# Patient Record
Sex: FEMALE | Race: OTHER | NOT HISPANIC OR LATINO | URBAN - METROPOLITAN AREA
[De-identification: names, ages, dates, MRNs, and addresses within clinical notes are randomized per-mention and may not be internally consistent; named-entity substitution may affect disease eponyms.]

---

## 2017-07-01 ENCOUNTER — EMERGENCY (EMERGENCY)
Facility: HOSPITAL | Age: 52
LOS: 1 days | Discharge: HOME | End: 2017-07-01
Attending: EMERGENCY MEDICINE
Payer: MEDICAID

## 2017-07-01 VITALS
RESPIRATION RATE: 18 BRPM | TEMPERATURE: 98 F | WEIGHT: 130.07 LBS | SYSTOLIC BLOOD PRESSURE: 123 MMHG | HEIGHT: 64 IN | HEART RATE: 74 BPM | OXYGEN SATURATION: 99 % | DIASTOLIC BLOOD PRESSURE: 71 MMHG

## 2017-07-01 VITALS
SYSTOLIC BLOOD PRESSURE: 118 MMHG | TEMPERATURE: 98 F | DIASTOLIC BLOOD PRESSURE: 69 MMHG | RESPIRATION RATE: 18 BRPM | OXYGEN SATURATION: 100 % | HEART RATE: 72 BPM

## 2017-07-01 LAB
ALBUMIN SERPL ELPH-MCNC: 4.9 G/DL — SIGNIFICANT CHANGE UP (ref 3.3–5.2)
ALP SERPL-CCNC: 80 U/L — SIGNIFICANT CHANGE UP (ref 40–120)
ALT FLD-CCNC: 19 U/L — SIGNIFICANT CHANGE UP
ANION GAP SERPL CALC-SCNC: 15 MMOL/L — SIGNIFICANT CHANGE UP (ref 5–17)
AST SERPL-CCNC: 25 U/L — SIGNIFICANT CHANGE UP
BASOPHILS # BLD AUTO: 0.1 K/UL — SIGNIFICANT CHANGE UP (ref 0–0.2)
BASOPHILS NFR BLD AUTO: 0.8 % — SIGNIFICANT CHANGE UP (ref 0–2)
BILIRUB SERPL-MCNC: 1.1 MG/DL — SIGNIFICANT CHANGE UP (ref 0.4–2)
BUN SERPL-MCNC: 13 MG/DL — SIGNIFICANT CHANGE UP (ref 8–20)
CALCIUM SERPL-MCNC: 9.6 MG/DL — SIGNIFICANT CHANGE UP (ref 8.6–10.2)
CHLORIDE SERPL-SCNC: 99 MMOL/L — SIGNIFICANT CHANGE UP (ref 98–107)
CO2 SERPL-SCNC: 25 MMOL/L — SIGNIFICANT CHANGE UP (ref 22–29)
CREAT SERPL-MCNC: 0.66 MG/DL — SIGNIFICANT CHANGE UP (ref 0.5–1.3)
EOSINOPHIL # BLD AUTO: 0.6 K/UL — HIGH (ref 0–0.5)
EOSINOPHIL NFR BLD AUTO: 6.4 % — HIGH (ref 0–6)
GLUCOSE SERPL-MCNC: 145 MG/DL — HIGH (ref 70–115)
HCT VFR BLD CALC: 43.2 % — SIGNIFICANT CHANGE UP (ref 37–47)
HGB BLD-MCNC: 15.1 G/DL — SIGNIFICANT CHANGE UP (ref 12–16)
LYMPHOCYTES # BLD AUTO: 2.7 K/UL — SIGNIFICANT CHANGE UP (ref 1–4.8)
LYMPHOCYTES # BLD AUTO: 29.7 % — SIGNIFICANT CHANGE UP (ref 20–55)
MCHC RBC-ENTMCNC: 31.4 PG — HIGH (ref 27–31)
MCHC RBC-ENTMCNC: 35 G/DL — SIGNIFICANT CHANGE UP (ref 32–36)
MCV RBC AUTO: 89.8 FL — SIGNIFICANT CHANGE UP (ref 81–99)
MONOCYTES # BLD AUTO: 0.8 K/UL — SIGNIFICANT CHANGE UP (ref 0–0.8)
MONOCYTES NFR BLD AUTO: 8.4 % — SIGNIFICANT CHANGE UP (ref 3–10)
NEUTROPHILS # BLD AUTO: 4.9 K/UL — SIGNIFICANT CHANGE UP (ref 1.8–8)
NEUTROPHILS NFR BLD AUTO: 54.5 % — SIGNIFICANT CHANGE UP (ref 37–73)
OB PNL STL: NEGATIVE — SIGNIFICANT CHANGE UP
PLATELET # BLD AUTO: 276 K/UL — SIGNIFICANT CHANGE UP (ref 150–400)
POTASSIUM SERPL-MCNC: 3.7 MMOL/L — SIGNIFICANT CHANGE UP (ref 3.5–5.3)
POTASSIUM SERPL-SCNC: 3.7 MMOL/L — SIGNIFICANT CHANGE UP (ref 3.5–5.3)
PROT SERPL-MCNC: 7.9 G/DL — SIGNIFICANT CHANGE UP (ref 6.6–8.7)
RBC # BLD: 4.81 M/UL — SIGNIFICANT CHANGE UP (ref 4.4–5.2)
RBC # FLD: 13.7 % — SIGNIFICANT CHANGE UP (ref 11–15.6)
SODIUM SERPL-SCNC: 139 MMOL/L — SIGNIFICANT CHANGE UP (ref 135–145)
TROPONIN T SERPL-MCNC: <0.01 NG/ML — SIGNIFICANT CHANGE UP (ref 0–0.06)
WBC # BLD: 9 K/UL — SIGNIFICANT CHANGE UP (ref 4.8–10.8)
WBC # FLD AUTO: 9 K/UL — SIGNIFICANT CHANGE UP (ref 4.8–10.8)

## 2017-07-01 PROCEDURE — 85027 COMPLETE CBC AUTOMATED: CPT

## 2017-07-01 PROCEDURE — 86618 LYME DISEASE ANTIBODY: CPT

## 2017-07-01 PROCEDURE — 80053 COMPREHEN METABOLIC PANEL: CPT

## 2017-07-01 PROCEDURE — 70450 CT HEAD/BRAIN W/O DYE: CPT | Mod: 26

## 2017-07-01 PROCEDURE — 36415 COLL VENOUS BLD VENIPUNCTURE: CPT

## 2017-07-01 PROCEDURE — 93010 ELECTROCARDIOGRAM REPORT: CPT

## 2017-07-01 PROCEDURE — 70450 CT HEAD/BRAIN W/O DYE: CPT

## 2017-07-01 PROCEDURE — 84484 ASSAY OF TROPONIN QUANT: CPT

## 2017-07-01 PROCEDURE — 82272 OCCULT BLD FECES 1-3 TESTS: CPT

## 2017-07-01 PROCEDURE — 99285 EMERGENCY DEPT VISIT HI MDM: CPT

## 2017-07-01 PROCEDURE — 99284 EMERGENCY DEPT VISIT MOD MDM: CPT | Mod: 25

## 2017-07-01 PROCEDURE — 93005 ELECTROCARDIOGRAM TRACING: CPT

## 2017-07-01 RX ORDER — SODIUM CHLORIDE 9 MG/ML
3 INJECTION INTRAMUSCULAR; INTRAVENOUS; SUBCUTANEOUS EVERY 8 HOURS
Qty: 0 | Refills: 0 | Status: DISCONTINUED | OUTPATIENT
Start: 2017-07-01 | End: 2017-07-05

## 2017-07-01 RX ORDER — SACCHAROMYCES BOULARDII 250 MG
1 POWDER IN PACKET (EA) ORAL
Qty: 20 | Refills: 0 | OUTPATIENT
Start: 2017-07-01 | End: 2017-07-11

## 2017-07-01 RX ORDER — MECLIZINE HCL 12.5 MG
1 TABLET ORAL
Qty: 15 | Refills: 0 | OUTPATIENT
Start: 2017-07-01 | End: 2017-07-06

## 2017-07-01 RX ORDER — FLUTICASONE PROPIONATE 50 MCG
1 SPRAY, SUSPENSION NASAL
Qty: 1 | Refills: 0 | OUTPATIENT
Start: 2017-07-01 | End: 2017-07-31

## 2017-07-01 RX ORDER — MECLIZINE HCL 12.5 MG
25 TABLET ORAL ONCE
Qty: 0 | Refills: 0 | Status: COMPLETED | OUTPATIENT
Start: 2017-07-01 | End: 2017-07-01

## 2017-07-01 RX ADMIN — Medication 1 TABLET(S): at 20:59

## 2017-07-01 RX ADMIN — Medication 25 MILLIGRAM(S): at 19:59

## 2017-07-01 NOTE — ED STATDOCS - ATTENDING CONTRIBUTION TO CARE
I, Juan C Hernandez, performed the initial face to face bedside interview with this patient regarding history of present illness, review of symptoms and relevant past medical, social and family history.  I completed an independent physical examination.  I was the initial provider who evaluated this patient. I have signed out the follow up of any pending tests (i.e. labs, radiological studies) to the ACP.  I have communicated the patient’s plan of care and disposition with the ACP.  The history, relevant review of systems, past medical and surgical history, medical decision making, and physical examination was documented by the scribe in my presence and I attest to the accuracy of the documentation.

## 2017-07-01 NOTE — ED ADULT NURSE NOTE - OBJECTIVE STATEMENT
patient states that she has been feeling dizzy whenever she gets up, she denies any chest pain or pain/ discomfort

## 2017-07-01 NOTE — ED STATDOCS - PROGRESS NOTE DETAILS
HPI, ROS, PE done by intake doc. Orders/Plan reviewed. Pt presents today with dizziness x several days. Pt states she is unsure if she was bite by a tick a few weeks ago. She also thinks she might have bloody stool. She admits to hx of constipation and one episode of vomiting on Thursday. Pt states she feels "out of it" and does not describe the room as spinning. No hx of vertigo. Sister states she has been blowing her nose all day. She denies fever, chills, nausea, vomiting-currently, sob, cp, hp, abd pain, weakness, confusion, headache, facial droop, photophobia, nuchal rigidity. PE- Well developed, well-nourish, resting comfortably in NAD. Nasal sounding. NO facial droop. Eyes: No nystagmus. PERRL b/l. EOM intact b/l. Ears: WNL Mouth: uvula midline. tongue midline. Sinus: tenderness to frontal and maxillary sinuses Abdomen- BS normoactive. Soft, nontender to palpation. Rectal exam: no rajiv blood. tag to outside of rectum. No masses. No tenderness. No internal hemorrhoids. Neuro: intact without focal deficits, A&Ox3, no gross sensory or motor deficits. No weakness. CN 2-12 intact. Negative rhomberg. No pronator. Finger to nose and rapid alternating motions intact. Gait intact. Heel to shin intact. Plan: Pt feeling better after meclizine. CT suggests sinusitis. Pt admits to feeling sinus pressure and always has problems with her sinuses. Will treat with Augmentin and florastor. Will give flonase and meclizine. F/u with ENT/GI/pmd/Neuro. Recommends outpt colonoscopy. EKG here WNL. Labs WNL except elevated glucose. Advised pt to have diabetes work up outpt.

## 2017-07-01 NOTE — ED STATDOCS - OBJECTIVE STATEMENT
51 y/o F presents to ED c/o dizziness x 2 weeks. Pt reports she went to the doctor in [place] and they told her to come to the ED to get blood tests. Pt states 4 days ago she woke up and the room was spinning and was imbalanced. Pt reports she is now only dizzy, and is unsure if she had bloody stool. Pt also states she was taking Ambient. Pt denies heart problems, nausea, fever, vomiting, diarrhea, SOB and CP.

## 2017-07-03 LAB
B BURGDOR C6 AB SER-ACNC: NEGATIVE — SIGNIFICANT CHANGE UP
B BURGDOR IGG+IGM SER-ACNC: 0.11 INDEX — SIGNIFICANT CHANGE UP (ref 0.01–0.89)

## 2017-10-09 NOTE — ED ADULT NURSE NOTE - INTEGUMENTARY WDL
Patient advised, appointment scheduled 1/8/18. Color consistent with ethnicity/race, warm, dry intact, resilient.

## 2021-01-18 ENCOUNTER — EMERGENCY (EMERGENCY)
Facility: HOSPITAL | Age: 56
LOS: 1 days | Discharge: HOME | End: 2021-01-18
Attending: EMERGENCY MEDICINE | Admitting: EMERGENCY MEDICINE
Payer: COMMERCIAL

## 2021-01-18 VITALS
WEIGHT: 136.03 LBS | DIASTOLIC BLOOD PRESSURE: 73 MMHG | TEMPERATURE: 99 F | OXYGEN SATURATION: 99 % | RESPIRATION RATE: 18 BRPM | HEART RATE: 84 BPM | HEIGHT: 64 IN | SYSTOLIC BLOOD PRESSURE: 145 MMHG

## 2021-01-18 DIAGNOSIS — F29 UNSPECIFIED PSYCHOSIS NOT DUE TO A SUBSTANCE OR KNOWN PHYSIOLOGICAL CONDITION: ICD-10-CM

## 2021-01-18 DIAGNOSIS — Z20.822 CONTACT WITH AND (SUSPECTED) EXPOSURE TO COVID-19: ICD-10-CM

## 2021-01-18 LAB
ALBUMIN SERPL ELPH-MCNC: 4.6 G/DL — SIGNIFICANT CHANGE UP (ref 3.3–5)
ALP SERPL-CCNC: 69 U/L — SIGNIFICANT CHANGE UP (ref 40–120)
ALT FLD-CCNC: 31 U/L — SIGNIFICANT CHANGE UP (ref 10–45)
AMPHET UR-MCNC: NEGATIVE — SIGNIFICANT CHANGE UP
ANION GAP SERPL CALC-SCNC: 13 MMOL/L — SIGNIFICANT CHANGE UP (ref 5–17)
APPEARANCE UR: CLEAR — SIGNIFICANT CHANGE UP
AST SERPL-CCNC: 43 U/L — HIGH (ref 10–40)
BARBITURATES UR SCN-MCNC: NEGATIVE — SIGNIFICANT CHANGE UP
BASOPHILS # BLD AUTO: 0.04 K/UL — SIGNIFICANT CHANGE UP (ref 0–0.2)
BASOPHILS NFR BLD AUTO: 0.5 % — SIGNIFICANT CHANGE UP (ref 0–2)
BENZODIAZ UR-MCNC: NEGATIVE — SIGNIFICANT CHANGE UP
BILIRUB SERPL-MCNC: 1.6 MG/DL — HIGH (ref 0.2–1.2)
BILIRUB UR-MCNC: NEGATIVE — SIGNIFICANT CHANGE UP
BUN SERPL-MCNC: 9 MG/DL — SIGNIFICANT CHANGE UP (ref 7–23)
CALCIUM SERPL-MCNC: 9.3 MG/DL — SIGNIFICANT CHANGE UP (ref 8.4–10.5)
CHLORIDE SERPL-SCNC: 102 MMOL/L — SIGNIFICANT CHANGE UP (ref 96–108)
CO2 SERPL-SCNC: 24 MMOL/L — SIGNIFICANT CHANGE UP (ref 22–31)
COCAINE METAB.OTHER UR-MCNC: POSITIVE
COLOR SPEC: YELLOW — SIGNIFICANT CHANGE UP
CREAT SERPL-MCNC: 0.73 MG/DL — SIGNIFICANT CHANGE UP (ref 0.5–1.3)
DIFF PNL FLD: NEGATIVE — SIGNIFICANT CHANGE UP
EOSINOPHIL # BLD AUTO: 0.2 K/UL — SIGNIFICANT CHANGE UP (ref 0–0.5)
EOSINOPHIL NFR BLD AUTO: 2.7 % — SIGNIFICANT CHANGE UP (ref 0–6)
ETHANOL SERPL-MCNC: <10 MG/DL — SIGNIFICANT CHANGE UP (ref 0–10)
GLUCOSE SERPL-MCNC: 104 MG/DL — HIGH (ref 70–99)
GLUCOSE UR QL: NEGATIVE — SIGNIFICANT CHANGE UP
HCT VFR BLD CALC: 42.5 % — SIGNIFICANT CHANGE UP (ref 34.5–45)
HGB BLD-MCNC: 14 G/DL — SIGNIFICANT CHANGE UP (ref 11.5–15.5)
IMM GRANULOCYTES NFR BLD AUTO: 0.1 % — SIGNIFICANT CHANGE UP (ref 0–1.5)
KETONES UR-MCNC: 15 MG/DL
LEUKOCYTE ESTERASE UR-ACNC: NEGATIVE — SIGNIFICANT CHANGE UP
LYMPHOCYTES # BLD AUTO: 1.89 K/UL — SIGNIFICANT CHANGE UP (ref 1–3.3)
LYMPHOCYTES # BLD AUTO: 25.6 % — SIGNIFICANT CHANGE UP (ref 13–44)
MCHC RBC-ENTMCNC: 29.1 PG — SIGNIFICANT CHANGE UP (ref 27–34)
MCHC RBC-ENTMCNC: 32.9 GM/DL — SIGNIFICANT CHANGE UP (ref 32–36)
MCV RBC AUTO: 88.4 FL — SIGNIFICANT CHANGE UP (ref 80–100)
METHADONE UR-MCNC: NEGATIVE — SIGNIFICANT CHANGE UP
MONOCYTES # BLD AUTO: 0.52 K/UL — SIGNIFICANT CHANGE UP (ref 0–0.9)
MONOCYTES NFR BLD AUTO: 7.1 % — SIGNIFICANT CHANGE UP (ref 2–14)
NEUTROPHILS # BLD AUTO: 4.71 K/UL — SIGNIFICANT CHANGE UP (ref 1.8–7.4)
NEUTROPHILS NFR BLD AUTO: 64 % — SIGNIFICANT CHANGE UP (ref 43–77)
NITRITE UR-MCNC: NEGATIVE — SIGNIFICANT CHANGE UP
NRBC # BLD: 0 /100 WBCS — SIGNIFICANT CHANGE UP (ref 0–0)
OPIATES UR-MCNC: NEGATIVE — SIGNIFICANT CHANGE UP
PCP SPEC-MCNC: SIGNIFICANT CHANGE UP
PCP UR-MCNC: NEGATIVE — SIGNIFICANT CHANGE UP
PH UR: 6 — SIGNIFICANT CHANGE UP (ref 5–8)
PLATELET # BLD AUTO: 254 K/UL — SIGNIFICANT CHANGE UP (ref 150–400)
POTASSIUM SERPL-MCNC: 4.3 MMOL/L — SIGNIFICANT CHANGE UP (ref 3.5–5.3)
POTASSIUM SERPL-SCNC: 4.3 MMOL/L — SIGNIFICANT CHANGE UP (ref 3.5–5.3)
PROT SERPL-MCNC: 7 G/DL — SIGNIFICANT CHANGE UP (ref 6–8.3)
PROT UR-MCNC: NEGATIVE MG/DL — SIGNIFICANT CHANGE UP
RBC # BLD: 4.81 M/UL — SIGNIFICANT CHANGE UP (ref 3.8–5.2)
RBC # FLD: 13.2 % — SIGNIFICANT CHANGE UP (ref 10.3–14.5)
SARS-COV-2 RNA SPEC QL NAA+PROBE: SIGNIFICANT CHANGE UP
SODIUM SERPL-SCNC: 139 MMOL/L — SIGNIFICANT CHANGE UP (ref 135–145)
SP GR SPEC: 1.02 — SIGNIFICANT CHANGE UP (ref 1–1.03)
THC UR QL: NEGATIVE — SIGNIFICANT CHANGE UP
TSH SERPL-MCNC: 1.21 UIU/ML — SIGNIFICANT CHANGE UP (ref 0.35–4.94)
UROBILINOGEN FLD QL: 0.2 E.U./DL — SIGNIFICANT CHANGE UP
WBC # BLD: 7.37 K/UL — SIGNIFICANT CHANGE UP (ref 3.8–10.5)
WBC # FLD AUTO: 7.37 K/UL — SIGNIFICANT CHANGE UP (ref 3.8–10.5)

## 2021-01-18 PROCEDURE — 83735 ASSAY OF MAGNESIUM: CPT

## 2021-01-18 PROCEDURE — 99285 EMERGENCY DEPT VISIT HI MDM: CPT

## 2021-01-18 PROCEDURE — 70450 CT HEAD/BRAIN W/O DYE: CPT | Mod: 26,MA

## 2021-01-18 PROCEDURE — 93005 ELECTROCARDIOGRAM TRACING: CPT

## 2021-01-18 PROCEDURE — 93010 ELECTROCARDIOGRAM REPORT: CPT

## 2021-01-18 PROCEDURE — 90792 PSYCH DIAG EVAL W/MED SRVCS: CPT

## 2021-01-18 PROCEDURE — 99285 EMERGENCY DEPT VISIT HI MDM: CPT | Mod: 25

## 2021-01-18 PROCEDURE — U0003: CPT

## 2021-01-18 PROCEDURE — 85025 COMPLETE CBC W/AUTO DIFF WBC: CPT

## 2021-01-18 PROCEDURE — 36415 COLL VENOUS BLD VENIPUNCTURE: CPT

## 2021-01-18 PROCEDURE — U0005: CPT

## 2021-01-18 PROCEDURE — 80053 COMPREHEN METABOLIC PANEL: CPT

## 2021-01-18 PROCEDURE — 80307 DRUG TEST PRSMV CHEM ANLYZR: CPT

## 2021-01-18 PROCEDURE — 84443 ASSAY THYROID STIM HORMONE: CPT

## 2021-01-18 PROCEDURE — 70450 CT HEAD/BRAIN W/O DYE: CPT

## 2021-01-18 PROCEDURE — 81003 URINALYSIS AUTO W/O SCOPE: CPT

## 2021-01-18 NOTE — ED PROVIDER NOTE - NSRISKOFTRANSFER_ED_A_ED
Deterioration of Condition En Route/Death or Disability/Increased Pain/Transportation Risk (There is always a risk of traffic delays resulting in deterioration of condition.)/Other:

## 2021-01-18 NOTE — ED PROVIDER NOTE - PATIENT PORTAL LINK FT
You can access the FollowMyHealth Patient Portal offered by [facility] by registering at the following website: [email or web address]. By joining "[employer]’s FollowMyHealth portal, you will also be able to view your health information using other applications (apps) compatible with our system.

## 2021-01-18 NOTE — ED BEHAVIORAL HEALTH ASSESSMENT NOTE - DESCRIPTION
denies patient is presenting with paranoia and VH. She does not wish to stay in the hospital. For this reason a 9.27 was completed for a psych admission.     Extensive psychoeducation was provided to father about possible differentials for patient's presentation. Differentials included delirium from medical cause vs acute psychotic episode vs first onset manic episode (brother is bipolar, mother is schizophrenic). After psychoeducation was provided to father, utox of patient came back positive for cocaine, which is another, very likely differential. Patient does not allow me to disclose to father drug use.    Writer spoke with ED physician about whether it makes sense to monitor patient overnight in ED, given the positive cocaine utox and see if her paranoia resolves overnight. However, patient is acutely paranoid, a flight risk, and staying in ED overnight is not a safe option given their limitation for supervision. 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

## 2021-01-18 NOTE — ED ADULT NURSE NOTE - NS ED NURSE DISCH DISPOSITION
Received a new referral from Any Hayden NP, for ARCINIEGA (dyspnea on exertion)   Transferred Discharged

## 2021-01-18 NOTE — ED BEHAVIORAL HEALTH ASSESSMENT NOTE - DETAILS
n/a brother is bipolar, mother schizophrenic to telepsych team which will handle placement denies attending

## 2021-01-18 NOTE — ED PROVIDER NOTE - CLINICAL SUMMARY MEDICAL DECISION MAKING FREE TEXT BOX
54 yo F, post-menopausal X 3-4 years, takes Valtrex prn cold sores and Ambien as needed for insomnia (and has not taken this in months), otherwise healthy and denies any other medical or psychiatric history presents from her PCP's office (Dr. Hewitt) as she was told that she needed to come in for a psychiatric evaluation. Pt is here with her father and states "I have a lot of family drama going on now and we came home and everything was different". Pt states that she feels like people have moved things around the house without her knowledge. Also states that her gloves are not fitting correctly and she knows that "something is wrong". States her family member may have used her gloves without her knowledge. Denies S/HI/ hallucinations. Pt denies any history of chronic psychiatric illness or admissions for psychiatric illness in the past. States that 3 years ago she was prescribed an anti-depressant (name unknown), but only took 3 pills. Denies illicit drug use/ tobacco use. Drinks alcohol 2-3 times per week. No CP, SOB, HA, fevers, chills, focal neuro deficits, cough, abd pain, neck pain, urinary sxs. No other complaints.  ED course: Pt afebrile and HD stable. Labs noted and with no acute findings. Alcohol negative. UA negative for infection. Psych consulted and pt needs to be admitted for psychoses. 1:1 observation placed. Pt initially refusing to undress and be admitted but was convinced to stay. Involuntary paperwork signed by psychiatrist and myself/ RN and bed to be arranged by SW/ and telepsych. Drug screen positive for cocaine and pt admits to using cocaine yesterday, after previously denying any illicit drugs. Pt medically cleared and stable in ED. Plan discussed with pt's father. Pt stable in ED. 56 yo F, post-menopausal X 3-4 years, takes Valtrex prn cold sores and Ambien as needed for insomnia (and has not taken this in months), otherwise healthy and denies any other medical or psychiatric history presents from her PCP's office (Dr. Hewitt) as she was told that she needed to come in for a psychiatric evaluation. Pt is here with her father and states "I have a lot of family drama going on now and we came home and everything was different". Pt states that she feels like people have moved things around the house without her knowledge. Also states that her gloves are not fitting correctly and she knows that "something is wrong". States her family member may have used her gloves without her knowledge. Denies S/HI/ hallucinations. Pt denies any history of chronic psychiatric illness or admissions for psychiatric illness in the past. States that 3 years ago she was prescribed an anti-depressant (name unknown), but only took 3 pills. Denies illicit drug use/ tobacco use. Drinks alcohol 2-3 times per week. No CP, SOB, HA, fevers, chills, focal neuro deficits, cough, abd pain, neck pain, urinary sxs. No other complaints.  ED course: Pt afebrile and HD stable. Labs noted and with no acute findings. Alcohol <10. UA negative for infection. CT head with NAD. Covid 19 pending. Psych consulted and pt needs to be admitted for psychoses. 1:1 observation placed. Pt initially refusing to undress and be admitted but was convinced to stay. Involuntary paperwork signed by psychiatrist and myself/ RN and bed to be arranged by SW/ and telepsych. Drug screen positive for cocaine and pt admits to using cocaine yesterday, after previously denying any illicit drugs. Pt medically cleared and stable in ED. Plan discussed with pt's father. Pt stable in ED. Case endorsed to night team pending psych placement.

## 2021-01-18 NOTE — ED PROVIDER NOTE - PROGRESS NOTE DETAILS
Psych consulted and pt needs to be admitted for psychoses. 1:1 observation placed. Pt initially refusing to undress and be admitted but was convinced to stay. Involuntary paperwork signed by psych and myself and bed to be arranged by SW/ telepsych. Pt medically cleared and stable in ED. Plan discussed with pt's father. received signout- plan for involuntary admission. awaiting for placement/transfer per SW/psychiatry. disposition pending. pt seated in hallway, stable with father. received signout- plan for involuntary admission. awaiting for placement/transfer per SW/psychiatry. tele psych awaiting for covid result prior to bed search. disposition pending. pt seated in hallway, stable with father. pt remains stable w/o interim event. Director - pt received from Dr Cervantes; cont to await psych dispo.  Pt resting comfortably.  Pt medically clear. pt accepted per telepsych to [facility]. paperwork sent mary ellen: pt to be dc'd home not transferred as per psych

## 2021-01-18 NOTE — ED PROVIDER NOTE - SHIFT CHANGE DETAILS
55F w/ psychosis. medically cleared. psych consulted. reccs for involuntary admission. papers done. stable. no interim event.    dispo: pending psych placement

## 2021-01-18 NOTE — ED PROVIDER NOTE - PSYCHIATRIC, MLM
Alert and oriented to person, place, time/situation. Scattered thought process.  no apparent risk to self or others.

## 2021-01-18 NOTE — ED BEHAVIORAL HEALTH ASSESSMENT NOTE - SUMMARY
55 Y  female with no prior psych hx, cocaine use, significant family hx for bipolar disorder and schizophrenia, brought in by her father for paranoid ideations, at the recommendation of her medical dr. for the past week, patient has believed that her sister in law may be poisoning her and altering her clothes and items at home (her jeans, her sweaters). Her father states that this is an acute change in her mental status. Patient did test positive for cocaine, which she was reluctant to disclose. Otherwise medical workup is negative. She also has significant family hx of bipolar d/o (brother has been hospitalized for darien) and schizophrenia (mother). Patient does not wish to stay in the hospital, however given her acute paranoia, discharge is not a safe option. 9.27 was completed for psychiatric admission.    recommendations  -admit to psych for safety  -keep patient on 1:1 while in ED  -prn ativan and haldol for acute agitation  -patient currently refusing po psychotropics

## 2021-01-18 NOTE — ED PROVIDER NOTE - OBJECTIVE STATEMENT
56 yo F, post-menopausal X 3-4 years, takes Valtrex prn cold sores and Ambien as needed for insomnia (and has not taken this in months), otherwise healthy and denies any other medical or psychiatric history presents from her PCP's office as she was told that she needed to come in for a psychiatric evaluation. Pt is here with her father and states "I have a lot of family drama going on now". P 56 yo F, post-menopausal X 3-4 years, takes Valtrex prn cold sores and Ambien as needed for insomnia (and has not taken this in months), otherwise healthy and denies any other medical or psychiatric history presents from her PCP's office (Dr. Hewitt) as she was told that she needed to come in for a psychiatric evaluation. Pt is here with her father and states "I have a lot of family drama going on now and we came home and everything was different". Pt states that she feels like people have moved things around the house without her knowledge. Also states that her gloves are not fitting correctly and she knows that "something is wrong". States her family member may have used her gloves without her knowledge. Denies S/HI/ hallucinations. Pt denies any history of psychiatric illnes or admissions for psychiatric illness in the past. 56 yo F, post-menopausal X 3-4 years, takes Valtrex prn cold sores and Ambien as needed for insomnia (and has not taken this in months), otherwise healthy and denies any other medical or psychiatric history presents from her PCP's office (Dr. Hewitt) as she was told that she needed to come in for a psychiatric evaluation. Pt is here with her father and states "I have a lot of family drama going on now and we came home and everything was different". Pt states that she feels like people have moved things around the house without her knowledge. Also states that her gloves are not fitting correctly and she knows that "something is wrong". States her family member may have used her gloves without her knowledge. Denies S/HI/ hallucinations. Pt denies any history of chronic psychiatric illness or admissions for psychiatric illness in the past. States that 3 years ago she was prescribed an anti-depressant (name unknown), but only took 3 pills. Denies illicit drug use/ tobacco use. Drinks alcohol 2-3 times per week. No CP, SOB, HA, fevers, chills, focal neuro deficits, cough, abd pain, neck pain, urinary sxs. No other complaints.

## 2021-01-18 NOTE — ED BEHAVIORAL HEALTH ASSESSMENT NOTE - HOMICIDALITY / AGGRESSION (CURRENT/PAST)
What to expect when recovering from your EGD (esophagogastroduodenoscopy)    For your procedure today you received [facility] Anesthesia . Please refer to the handout About Your Monitored Anesthesia Care     Possible side-effects after your EGD:    Nausea may occur.  If you have nausea:   • Take sips of white soda, tea, juice or water.  • Eat smaller meals (avoid any fatty or deep fried foods).  • If nausea lasts more than 24 hours, call your doctor that performed the procedure.      A sore throat is a common occurrence after your procedure.  If you have a sore throat, pain or discomfort:  • Gargle with a warm salt water rinse.  • Try throat lozenges.  • You may take acetaminophen (Tylenol) unless instructed otherwise  • *If you had an EGD with dilatation, you may have pain behind your breastbone for a short period of time after the procedure.       Call your doctor if you have any of the following:  • Signs of bleeding include:  o Vomiting blood or coffee ground-like material.  o Dark, black, or maroon colored stools.  • Signs of infection include:  o Temperature over 101 degrees F. and/or chills.  o Redness or warmth around IV site.  • If you have increased abdominal or chest pain.      Diet Instructions:    • You may resume your normal diet.   • Continue to drink extra fluids today such as water, juice, Gatorade, etc.   • You may notice more belching or burping than usual; this is normal and should go away within a day or so.      The medication you received today may cause dizziness, drowsiness and impaired judgment.  • Take it easy for the remainder of today.  • You should not drive, operate heavy or potentially harmful equipment, make important legal decisions, or drink alcohol for 24 hours after receiving sedation.  • Rise slowly from a sitting or lying position. The medications you received can cause you to become lightheaded or dizzy.  • You may feel sore, stiff, or have slight bruising on your arm(s) from tape,  blood pressure cuffs, or the IV site. This is normal and should go away in a few days.    You may continue taking the medication that you usually take at home.      Findings: esophagitis, gastritis, biopsies taken    Handouts given: included    Additional information/questions:   · Dr. Wiley's office will call you with your biopsy results within 7 days.  Please call your doctor’s office if you have not heard from them in 7 days.    · If you have any questions/concerns before this time, please feel free to call the GI lab at [facility] [phone]  [facility] at [phone] In case of emergency, please go to the nearest emergency room for care.       Esophagitis  Do you often have burning pain in your chest? You may have esophagitis. This is an inflammation of the lining of the esophagus. The esophagus is the tube that connects the throat to the stomach. This sheet tells you more about esophagitis. It also explains your treatment options.  Two Main Types of Esophagitis     With esophagitis, the lining of the esophagus is inflamed.   · Reflux esophagitis. This is the more common type. It’s also called GERD (gastroesophageal reflux disease). Stomach contents with stomach acide rise up into the esophagus. This happens repeatedly and leads to irritation. Risk factors can include:  ¨ Being overweight  ¨ Asthma  ¨ Smoking  ¨ Pregnancy  ¨ Frequent vomiting  ¨ Certain medications (such as aspirin and other anti-inflammatories)  ¨ Hiatal hernia, which is when the upper part of the stomach pushes upward through the diaphragm (the muscle between the chest and the abdomen)  · Infectious esophagitis. This condition is caused by an infection. Certain factors can make this more likely. These include a weakened immune system and poor nutrition. Antibiotic use can also be a factor. The infection is often due to the following:  ¨ A type of fungus (typically candida)  ¨ A virus, such as herpes  simplex virus 1 or cytomegalovirus (CMV)  Symptoms of Esophagitis  The following symptoms can occur for both types of esophagitis:  · Pain when swallowing, or trouble swallowing  · Heartburn (pain behind the breastbone)  · Acid regurgitation  · Chronic sore throat  · Inflammation of the gums  · Cavities  · Bad breath  · Nausea  · Bleeding (indicated by bright red vomit or black, tarry stool)  These symptoms occur more often with reflux esophagitis:  · Coughing, wheezing, or asthma  · Hoarseness  Diagnosis of Esophagitis  Your healthcare provider will ask about your health history and symptoms. You’ll also be examined. Sometimes certain tests are needed. These may include:  · Upper endoscopy. A thin, flexible tube with a tiny light and camera is used. It is inserted through the mouth down into the esophagus. This allows the doctor to look for damage. A biopsy may also be done. This is when a small sample of tissue is removed. The sample is sent to a lab for testing.  · Upper GI x-ray with barium. An x-ray is done after the patient drinks a substance called barium. Barium may make problems in the esophagus easier to see on an x-ray.  · Esophageal pH. A soft, thin tube is passed into the esophagus through the nose or mouth for 24 hours to measure the acid in the esophagus.  · Esophageal Manometry. A soft, thin tube is passed into the esophagus through the nose or mouth to measure muscle contractions in the esophagus.  Treatment of Esophagitis  · Medications can help treat either type. For infectious esophagitis, medications can help clear the infection. For reflux esophagitis, medications are prescribed based on symptoms. For instance, minor symptoms can be treated with antacids. These are taken after meals and at bedtime. For more severe symptoms, certain medications (PPIs or proton pump inhibitors, such as omeprazole, and H2 blockers such as ranitidine) can help reduce the amount of acid in the stomach. Other  medications can help food move through the stomach more quickly.  · Lifestyle changes can help reduce irritation and ease symptoms:  ¨ Avoid spicy foods (pepper, chili powder, quinonez). Also avoid hard foods (nuts, crackers, raw vegetables) and acidic foods and drinks (tomatoes, citrus fruits and juices). Other problem foods include chocolate, peppermint, nutmeg, and foods high in fat.  ¨ Until you can swallow without pain, follow a combined liquid and soft diet. Try foods such as cooked cereals, mashed potatoes, and soups.  ¨ Take small bites and chew your food thoroughly.  ¨ Avoid large meals and heavy evening meals. Don't lie down within 2 to 3 hours of eating.  ¨ Get to or maintain a healthy weight.  ¨ Avoid alcohol, caffeine, and smoking or tobacco products.  ¨ Practice good oral hygiene.  ¨ Raise your upper body by 4 to 6 inches when lying in bed. This can be done using a foam wedge. Or put blocks under the legs at the head of your bed.  · Surgery may be needed for severe reflux esophagitis. Your doctor can tell you more.     Why Treatment Is Important  Without treatment, esophagitis can worsen. This is especially true with severe reflux esophagitis. For instance, continued symptoms can cause scarring of the esophagus. Over time, this can create a stricture, or narrowing. This can result in trouble passing food down to the stomach. Continued symptoms can also cause changes in the lining of the esophagus. These changes can put you at a slightly higher risk of cancer of the esophagus.   © 4651-5408 [employer]. [address] [place] [address], [place] [address]. All rights reserved. This information is not intended as a substitute for professional medical care. Always follow your healthcare professional's instructions.      Gastritis (Adult)    Gastritis is inflammation and irritation of the stomach lining. It can be present for a short time (acute) or be long lasting (chronic). Gastritis is often caused by  infection with bacteria called H pylori. More than a third of people in the [place] have this bacteria in their bodies. In many cases, H pylori causes no problems or symptoms. In some people, though, the infection irritates the stomach lining and causes gastritis. Other causes of stomach irritation include drinking alcohol or taking pain-relieving medicines called NSAIDs (such as aspirin or ibuprofen).   Symptoms of gastritis can include:  · Abdominal pain or bloating  · Loss of appetite  · Nausea or vomiting  · Vomiting blood or having black stools  · Feeling more tired than usual  An inflamed and irritated stomach lining is more likely to develop a sore called an ulcer. To help prevent this, gastritis should be treated.  Home care  If needed, medicines may be prescribed. If you have H pylori infection, treating it will likely relieve your symptoms. Other changes can help reduce stomach irritation and help it heal.  · If you have been prescribed medicines for H pylori infection, take them as directed. Take all of the medicine until it is finished or your healthcare provider tells you to stop, even if you feel better.  · Your healthcare provider may recommend avoiding NSAIDs. If you take daily aspirin for your heart or other medical reasons, do not stop without talking to your healthcare provider first.  · Avoid drinking alcohol.  · Stop smoking. Smoking can irritate the stomach and delay healing. As much as possible, stay away from second hand smoke.  Follow-up care  Follow up with your healthcare provider, or as advised by our staff. Testing may be needed to check for inflammation or an ulcer.  When to seek medical advice  Call your healthcare provider for any of the following:  · Stomach pain that gets worse or moves to the lower right abdomen (appendix area)  · Chest pain that appears or gets worse, or spreads to the back, neck, shoulder, or arm  · Frequent vomiting (can’t keep down liquids)  · Blood in the stool or  vomit (red or black in color)  · Feeling weak or dizzy  · Fever of 100.4ºF (38ºC) or higher, or as directed by your healthcare provider  © 5758-3510 [employer]. [address] [place] [address], [place] [address]. All rights reserved. This information is not intended as a substitute for professional medical care. Always follow your healthcare professional's instructions.      Bladder Infection, Female (Adult)    Normally, bacteria do not stay in the urine. But when they do, the urine can become infected. This is called a urinary tract infection (UTI). An infection can occur anywhere in the urinary tract, from the kidney to the bladder and urethra. The most common place for a UTI is in the bladder. This is called a bladder infection. This is one of the most common infections in women. Most bladder infections are easily treated. They are not serious unless the infection spreads up to the kidney.  The phrases \"bladder infection\", \"UTI,\" and \"cystitis,\" are often used to describe the same thing, but they are not always the same. Cystitis is an inflammation of the bladder. The most common cause of cystitis is an infection.  In summary:  · Infections in the urine are called UTIs.  · Cystitis is usually caused by a UTI.  · Not al UTIs and cases of cystitis are bladder infections.  · Bladder infections are the most common type of cystitis.  Symptoms  The infection causes inflammation in the urethra and bladder, which causes many of the symptoms. The most common symptoms of a bladder infection are:  · Pain or burning when urinating  · Having to urinate more often than usual  · Urgent need to urinate  · Only a small amount of urine comes out  · Blood in urine  · Abdominal discomfort, usually in the lower abdomen, above the pubic bone  · Cloudy, strong, or bad smelling urine  · Urinary retention, being unable to urinate  · Unable to hold urine in (urinary incontinence)  · Fever  · Loss of appetite  · Confusion (in older  adults)  Causes  Bladder infections are not contagious. You can't get one from someone else, from a toilet seat, or from sharing a bath.  The most common cause of bladder infections is bacteria from the bowels. The bacteria get onto the skin around the opening of the urethra. From there it can get into the urine and travel up to the bladder, causing inflammation and infection. This usually happens because of:  · Wiping improperly after urinating. Always wipe from front to back.  · Bowel incontinence  · Pregnancy  · Procedures such as having a catheter inserted  · Older age  · Not emptying your bladder (stagnated urine gives bacteria a chance to grow)  · Dehydration  · Constipation  · Sex  · Use of a diaphragm for birth control   Treatment  Bladder infections are diagnosed by a urine test. They are treated with antibiotics and usually clear up quickly without complications. Treatment helps prevent a more serious kidney infection.  Medicines  Medicines can help in the treatment of a bladder infection:  · Take antibiotics until they are used up, even if you feel better. It is important to finish them to make sure the infection has cleared.  · You can use acetaminophen or ibuprofen for pain, fever, or discomfort, unless another medicine was prescribed. You can also alternate them, or use both together. They work differently and are a different class of medicines, so taking them together is not an overdose. If you have chronic liver or kidney disease, talk with your healthcare provider before using these medicines. Also talk with your provider if you've ever had a stomach ulcer or gastrointestinal bleeding, or are taking blood-thinner medicines.  · If you are given phenazopydridine to reduce burning with urination, it will cause your urine to become a bright orange color. This can stain clothing.  Care and prevention  These self-care steps can help prevent future infections:  · Drink plenty of fluids to prevent  dehydration and flush out of the bladder. Do this unless you must restrict fluids for other health reasons, or your doctor told you not to.  · Proper cleaning after going to the bathroom is important. Wipe from front to back after using the toilet to prevent the spread of bacteria.  · Urinate more often. Don't try to hold urine in for a long time.  · Wear loose-fitting clothes and cotton underwear. Avoid tight-fitting pans.  · Improve your diet and prevent constipation. Eat more fresh fruit and vegetables, and fiber, and less junk and fatty foods.  · Avoid sex until your symptoms are gone.  · Avoid caffeine, alcohol, and spicy foods. These can irritate the bladder.  · Urinate right after intercourse to flush out the bladder.  · If you use birth control pills and have frequent bladder infections, discuss it with your doctor.  Follow-up care  Call your healthcare provider if all symptoms are not gone after 3 days of treatment. This is especially important if you have repeat infections.  If a culture was done, you will be told if your treatment needs to be changed. If directed, you can call to find out the results.  If X-rays were done, you will be told if the results will affect your treatment.  Call 911  Call emergency services if any of the following occur:  · Trouble breathing  · Difficulty arousing or confusion  · Fainting or loss of consciousness  · Rapid heart rate  When to seek medical advice  Call your healthcare provider right away if any of these occur:  · Fever of 100.4ºF (38.0ºC) or higher, or as directed  · Symptoms are not better by the third day of treatment  · Back or belly (abdominal) pain that gets worse  · Repeated vomiting, or unable to keep medicine down  · Weakness or dizziness  · Vaginal discharge  · Pain, redness, or swelling in the outer vaginal area (labia)  © 6704-2360 [employer]. [address] [place] [address], [place] [address]. All rights reserved. This information is not intended  as a substitute for professional medical care. Always follow your healthcare professional's instructions.        Pancreatitis  The pancreas is an organ in the abdomen that secretes digestive juices into the stomach. Pancreatitis is an inflammation of the pancreas. In many cases, it is caused when the duct that connects the pancreas and gallbladder is blocked by a gallstone. Heavy alcohol use can also cause it. Less common causes can include medications, trauma, certain medical procedures, viruses, and toxins. Sometimes the cause of pancreatitis cannot be found.  Symptoms of pancreatitis include:  · Severe abdominal pain   · Nausea, vomiting  · Severe indigestion  · Racing heart  · Fever  At first, pancreatitis may be treated in the hospital. There, fluids and medications can be provided. The underlying cause of the problem must also be treated to prevent further problems. If gallstones are the cause, you and your healthcare provider can discuss options for treating them. If alcohol is the cause, talk to your healthcare provider about a program to help you stop drinking.  Home care  · Avoid alcohol.  · Rest in bed or sit up in a chair until you feel better.  · Take medicines as prescribed. If you were given an antibiotic for infection, take it until it is gone, even if you feel better. Let your healthcare provider know if you vomit up your medicine.  · Eating and drinking:  ¨ If instructed, avoid eating or drinking until nausea and vomiting go away.  ¨ Try sipping clear liquids to prevent dehydration.   ¨ When you begin eating again, start with small amounts. Have small, more frequent meals rather than larger meals.  Follow-up care  Follow up with your healthcare provider as advised.  When to seek medical advice  Call your healthcare provider for if any of the following:  · Continued or worsening pain  · Repeated vomiting  · Dizziness, weakness  · Fever of 100.4º F (38.0º C) or higher, or as directed by your  healthcare provider  · Severe muscle cramps  Call 911  Get emergency medical care for any of the following:  · Vomiting blood or large amounts of blood in stool  · Seizure  · Loss of consciousness  © 0250-2068 [employer]. [address] [place] [address], [place] [address]. All rights reserved. This information is not intended as a substitute for professional medical care. Always follow your healthcare professional's instructions.       Clostridium difficile Infection  Clostridium difficile or C. diff bacteria can be very harmful. They affect the intestinal tract. They can cause symptoms ranging from mild diarrhea to severe inflammation of the colon (large intestine). C diff infection is most common during or days to weeks after treatment with antibiotics. Anyone can become infected. But the risk is greatly increased for people in hospitals and for people living in nursing homes or long-term care facilities. This is because antibiotic use is common there. Germs also spread easily in these places.   What causes C. diff Infection?  The stomach and intestines have hundreds of kinds of bacteria. Many of these bacteria actually help keep harmful bacteria like C. diff from causing problems. Small amounts of C. diff are normal and do not cause problems. When a person takes an antibiotic, the normal balance of good and bad bacteria may be affected. There may be too little good bacteria and too much harmful bacteria like C diff. In hospitals and nursing homes, C. diff may be spread from an infected patient to other patients. This may occur when staff or visitors touch infected patients or objects, such as bed rails, stethoscopes, or bedpans and then touch other patients or surfaces.  What are the symptoms of C. diff infection?  About half of the people with C. diff infection have no symptoms. Yet they can still pass the infection to others. Others do have symptoms. These include:  · Watery diarrhea, which may contain  mucus  · Pain, and cramping  · Fever  Some who are infected develop serious problems. Symptoms include:  · Abdominal pain  · Abdominal swelling  · Nausea, and vomiting  · Little or no diarrhea  How is C. diff infection diagnosed?  To confirm the infection, a sample of stool is tested for the bacteria or the toxins made by the bacteria.  How is C diff infection treated?  The first step is to stop taking antibiotics. A different medication may prescribed. In certain cases, an antibiotic directed at the C diff infection may be given.  · Fluids are often given intravenously (IV) or through a vein. This helps replace fluids lost through diarrhea.  · Surgery may be needed if treatment fails to cure severe symptoms     To lessen symptoms:  · Drink plenty of fluids to replace water lost through diarrhea. Talk with your health care provider or nurse about which fluids are best.  · Follow your health care provider’s instructions for when and what to eat.  · Unless your health care provider tells you to do so, do not take medications for diarrhea.  · Tell your health care provider if symptoms return. Even after treatment, C. difficile may come back.  What are the complications of C. diff infection?  Complications include:  · Dehydration  · Electrolyte imbalances  · Low protein in the blood  · Severe dilation or widening of the large intestine  · Bowel perforation  · Low blood pressure  · Kidney failure  · Inflammation or infection throughout the body  · Death  How is C. diff prevented?  Hospitals and nursing homes take these steps to help prevent C. diff infections:  · Limiting use of antibiotics. Giving antibiotics only when needed can help reduce C. diff infections.  · Handwashing. Hospital staff should wash their hands before and after treating each patient. They should also wash their hands after touching any surface in patients' rooms. Soap and water work better than alcohol-based hand .  · Protective  clothing. Health care workers should wear gloves and a gown when entering the room of a patient with C. diff infection. They should remove these items before leaving and then wash their hands.  · Private rooms. People with C. diff should be in private rooms. Or, they may share rooms with others who have the same infection.  · Thorough cleaning. Equipment and rooms should be cleaned and disinfected daily.  · Education. Patients and visitors should be shown the best ways to avoid infection.  Patients can do the following to help prevent C. diff:  · Take antibiotics only when you really need them. Antibiotics don’t help treat illnesses caused by viruses. This includes colds and the flu. Don’t ask for antibiotics from your health care provider if he or she says they won’t work.  · When you are given antibiotics, take them as directed. Don’t increase or decrease the dosage. Do not take them for shorter or longer than your provider tells you to, even if you feel better.  · Wash your hands carefully. Do this after using the bathroom and before eating. Use plenty of soap and warm water. Alcohol-based hand  may not work against C. diff germs.  Everyone can help prevent C. diff with the following:  · In a hospital or care facility:  ¨ Wash your hands well before and after visiting someone who has C. diff infection. Use soap and water. Alcohol-based hand  may not work against C. diff.  ¨ If the staff asks you to, wear gloves. Take any other steps you are asked to follow to help prevent infection.  · At home:  ¨ Wear gloves when caring for a family member with C. diff infection. Throw the gloves away after each use. Then wash your hands well.  ¨ Wash the patient’s clothes, bed linen, and towels separately. Use hot water. Use both detergent and liquid bleach.  ¨ Disinfect surfaces in the patient’s room. This includes the phone, light switches, and remote controls.  · Practice good handwashing  ¨ Use warm water  and plenty of soap. Rub your hands together well.  ¨ Clean the whole hand. Wash under nails, between fingers, and up the wrists.  ¨ Wash for at least 15 seconds to 20 seconds.   ¨ Rinse. Let the water run down your fingers, not up your wrists.  ¨ Dry your hands well. Then use a paper towel to turn off the faucet and open the door.  © 2175-1749 [employer]. [address] [place] [address], [place] [address]. All rights reserved. This information is not intended as a substitute for professional medical care. Always follow your healthcare professional's instructions.                       None known in lifetime

## 2021-01-18 NOTE — ED ADULT NURSE NOTE - OBJECTIVE STATEMENT
Pt presents to ED, requesting psych eval. Pt saw PCP today who referred her to ED for psych eval. Denies seeing out pt psych before, appears manic. Pt ahs concerns about issues at home and her sister possibly administering her medication without her knowledge. Pt AAOX3, MAEx4. Pt denies SI or HI, speaking fast but calm and cooperative. Blood drawn, EKG done and urine cup given.

## 2021-01-19 VITALS
DIASTOLIC BLOOD PRESSURE: 83 MMHG | HEART RATE: 75 BPM | OXYGEN SATURATION: 100 % | SYSTOLIC BLOOD PRESSURE: 145 MMHG | RESPIRATION RATE: 20 BRPM | TEMPERATURE: 98 F

## 2021-01-19 PROCEDURE — 99285 EMERGENCY DEPT VISIT HI MDM: CPT

## 2021-01-19 NOTE — ED BEHAVIORAL HEALTH NOTE - BEHAVIORAL HEALTH NOTE
Telepsych Reassessment:    Patient reassess at 0416am. She reports she is feeling fine, but tired, sleeping on and off. She states she was referred to the ER by her doctor after she admitted to thinking things that did not make sense. Patient was guarded and did not elaborate further. She verbalizes understanding of the plan that she will be transferred to the inpatient psychiatric unit. Patient is informed she will be going to [facility].     MSE:     Summary:   55 Y  female with no prior psych hx, cocaine use, significant family hx for bipolar disorder and schizophrenia, brought in by her father for paranoid ideations, at the recommendation of her medical dr. for the past week, patient has believed that her sister in law may be poisoning her and altering her clothes and items at home (her jeans, her sweaters). Her father states that this is an acute change in her mental status. Patient did test positive for cocaine, which she was reluctant to disclose. Otherwise medical workup is negative. She also has significant family hx of bipolar d/o (brother has been hospitalized for darien) and schizophrenia (mother). Patient does not wish to stay in the hospital, however given her acute paranoia, discharge is not a safe option. 9.27 was completed for psychiatric admission.    recommendations  -admit to psych for safety  -keep patient on 1:1 while in ED  -prn ativan and haldol for acute agitation  -patient currently refusing po psychotropics Telepsych Reassessment:    Patient reassess at 0416am. She reports she is feeling fine, but tired, sleeping on and off. She states she was referred to the ER by her doctor after she admitted to thinking things that did not make sense. Patient was guarded and did not elaborate further. She verbalizes understanding of the plan that she will be transferred to the inpatient psychiatric unit. Patient is informed she will be going to [facility]. She appears mostly calm and cooperative but continues to resist inpatient hospitalization and lacks insight and judgment.    Summary:   55 Y  female with no prior psych hx, cocaine use, significant family hx for bipolar disorder and schizophrenia, brought in by her father for paranoid ideations, at the recommendation of her medical dr. for the past week, patient has believed that her sister in law may be poisoning her and altering her clothes and items at home (her jeans, her sweaters). Her father states that this is an acute change in her mental status. Patient did test positive for cocaine, which she was reluctant to disclose. Otherwise medical workup is negative. She also has significant family hx of bipolar d/o (brother has been hospitalized for darien) and schizophrenia (mother). Patient does not wish to stay in the hospital, however given her acute paranoia, discharge is not a safe option. 9.27 was completed for psychiatric admission.    recommendations  -admit to psych for safety  -keep patient on 1:1 while in ED  -prn ativan and haldol for acute agitation  -patient currently refusing po psychotropics

## 2021-01-19 NOTE — ED BEHAVIORAL HEALTH NOTE - BEHAVIORAL HEALTH NOTE
HPI: Per initial: 55 Y  female with no prior psych hx, cocaine use, significant family hx for bipolar disorder and schizophrenia, brought in by her father for paranoid ideations, at the recommendation of her medical dr. for the past week, patient has believed that her sister in law may be poisoning her and altering her clothes and items at home (her jeans, her sweaters). Her father states that this is an acute change in her mental status. Patient did test positive for cocaine, which she was reluctant to disclose. Otherwise medical workup is negative. She also has significant family hx of bipolar d/o (brother has been hospitalized for darien) and schizophrenia (mother). Patient does not wish to stay in the hospital, however given her acute paranoia, discharge is not a safe option. 9.27 was completed for psychiatric admission.    S: Pt reassessed this morning. 1:1 present, father present. Is noted to be calm, and cooperative, though at times is focused on wanting to know what she can or cannot take on an inpt unit. She states this is making her feel anxious. She reports improvement in her symptoms since being in the ED she was able to get some rest and had a sandwich. She reports that she is certain the cocaine that she used prior to presenting to ED was a bad batch, and reflects that it 'smelled funny' when she was using. While she denies ever having had these symptoms of paranoid ideation and bizarre behavior before, she has no real desire to stop using cocaine. She denies feeling of paranoia, and acknowledged that she was having some 'weird thoughts before. Denies SI of thoughts of harming others at this time. Has not received any PRNs while in ED. Has no current outpt psychiatric treatment, does not feel that she really needs any at this time, is open to getting more info on outpatient mental health and substance use treatment.  Father at bedside reported that pt seems more like herself since being in the ED, no paranoia observed. She does not want to be admitted to inpt psych.    MSE : ASA, fairly  groomed, cooperative, no PMR/PMA; Speech: normal r/t; Mood" good"; Affect: slightly constricted; TP: organized; TC: no SI/HI, no AVH/PI; I/J fair    Pt is substance user (cocaine) with significant family history of bipolar d/o and schizophrenia, though no psychiatric dx herself. While in ED, cocaine continued to be metabolized and her presenting symptoms of paranoia were no longer present this morning. No suicidality, homicidality or symptoms of psychosis present. Pt no longer meets criteria for involuntary inpt psych hospitalization and does not voluntarily want to be admitted.     cocaine use disorder, substance induced psychosis    Plan:  -patient is safe to be discharged   -patient given information for outpatient substance and mental health referrals in [place]. Was also told that she can [facility] tomorrow am for walk in services HPI: Per initial: 55 Y  female with no prior psych hx, cocaine use, significant family hx for bipolar disorder and schizophrenia, brought in by her father for paranoid ideations, at the recommendation of her medical dr. for the past week, patient has believed that her sister in law may be poisoning her and altering her clothes and items at home (her jeans, her sweaters). Her father states that this is an acute change in her mental status. Patient did test positive for cocaine, which she was reluctant to disclose. Otherwise medical workup is negative. She also has significant family hx of bipolar d/o (brother has been hospitalized for darien) and schizophrenia (mother). Patient does not wish to stay in the hospital, however given her acute paranoia, discharge is not a safe option. 9.27 was completed for psychiatric admission.    S: Pt reassessed this morning. 1:1 present, father present. Is noted to be calm, and cooperative, though at times is focused on wanting to know what she can or cannot take on an inpt unit. She states this is making her feel anxious. She reports improvement in her symptoms since being in the ED she was able to get some rest and had a sandwich. She reports that she is certain the cocaine that she used prior to presenting to ED was a bad batch, and reflects that it 'smelled funny' when she was using. While she denies ever having had these symptoms of paranoid ideation and bizarre behavior before, she has no real desire to stop using cocaine. She denies feeling of paranoia, and acknowledged that she was having some 'weird thoughts before. Denies SI of thoughts of harming others at this time. Has not received any PRNs while in ED. Has no current outpt psychiatric treatment, does not feel that she really needs any at this time, is open to getting more info on outpatient mental health and substance use treatment.  Father at bedside reported that pt seems more like herself since being in the ED, no paranoia observed. She does not want to be admitted to inpt psych.    MSE : ASA, fairly  groomed, cooperative, no PMR/PMA; Speech: normal r/t; Mood" good"; Affect: slightly constricted; TP: organized; TC: no SI/HI, no AVH/PI; I/J fair    Pt is substance user (cocaine) with significant family history of bipolar d/o and schizophrenia, though no psychiatric dx herself. While in ED, cocaine continued to be metabolized and her presenting symptoms of paranoia were no longer present this morning. No suicidality, homicidality or symptoms of psychosis present. Pt no longer meets criteria for involuntary inpt psych hospitalization and does not voluntarily want to be admitted.     cocaine use disorder, cocaine induced psychosis    Plan:  -patient is safe to be discharged   -patient given information for outpatient substance and mental health referrals in [place]. Was also told that she can go to [facility] tomorrow at 8:30 am for psychiatric follow-up. Also given information for [facility].   -Patient and father in agreement with plan.

## 2021-01-22 ENCOUNTER — EMERGENCY (EMERGENCY)
Facility: HOSPITAL | Age: 56
LOS: 1 days | Discharge: HOME | End: 2021-01-22
Admitting: EMERGENCY MEDICINE
Payer: COMMERCIAL

## 2021-01-22 VITALS
RESPIRATION RATE: 16 BRPM | OXYGEN SATURATION: 96 % | SYSTOLIC BLOOD PRESSURE: 138 MMHG | HEART RATE: 80 BPM | HEIGHT: 64 IN | TEMPERATURE: 98 F | DIASTOLIC BLOOD PRESSURE: 78 MMHG

## 2021-01-22 DIAGNOSIS — Z20.822 CONTACT WITH AND (SUSPECTED) EXPOSURE TO COVID-19: ICD-10-CM

## 2021-01-22 LAB — SARS-COV-2 RNA SPEC QL NAA+PROBE: SIGNIFICANT CHANGE UP

## 2021-01-22 PROCEDURE — U0005: CPT

## 2021-01-22 PROCEDURE — U0003: CPT

## 2021-01-22 PROCEDURE — 99283 EMERGENCY DEPT VISIT LOW MDM: CPT

## 2021-01-22 NOTE — ED PROVIDER NOTE - PATIENT PORTAL LINK FT
You can access the FollowMyHealth Patient Portal offered by [facility] by registering at the following website: [email or web address] By joining [facility]’s FollowMyHealth portal, you will also be able to view your health information using other applications (apps) compatible with our system.

## 2022-10-26 NOTE — ED BEHAVIORAL HEALTH ASSESSMENT NOTE - HPI (INCLUDE ILLNESS QUALITY, SEVERITY, DURATION, TIMING, CONTEXT, MODIFYING FACTORS, ASSOCIATED SIGNS AND SYMPTOMS)
55 Y  female with no prior psych hx, cocaine use, significant family hx for bipolar disorder and schizophrenia, brought in by her father for paranoid ideations, at the recommendation of her medical dr. Patient says that since last week, she has noticed that "everything in the house is different". She states that her 8 year old jeans suddenly have embroidery on them, her clothes have new laces, her sweater is a shade of purple she has never seen before. She currently lives with her father, brother and sister in law, and believes her sister in law is poisoning her and causing her to feel this way. She denies AH, VH, PI, depression, anxiety, SI, HI. She went to see her PMD today with her father, to "see if there was something in my body because of my sister in law" and her father brought her to the ED. Father is at bedside, says that he has never seen her act this way before, but agrees that she is acting in a bizarre and paranoid matter.    Patient had a medical workup given the new onset AMS, including a head CT , which was negative. She initially denied cocaine use, however her urine was positive for cocaine. When this was brought up, patient admits to occasional cocaine use, IN, last use last night. She admits the cocaine could have been laced with a hallucinogen but isnt sure. She has never felt paranoid on cocaine before and does not believe cocaine is the source of her paranoia. She did not give permission to me to disclose to her father her cocaine use. Isotretinoin Pregnancy And Lactation Text: This medication is Pregnancy Category X and is considered extremely dangerous during pregnancy. It is unknown if it is excreted in breast milk.